# Patient Record
Sex: MALE | Race: WHITE | URBAN - METROPOLITAN AREA
[De-identification: names, ages, dates, MRNs, and addresses within clinical notes are randomized per-mention and may not be internally consistent; named-entity substitution may affect disease eponyms.]

---

## 2023-05-08 ENCOUNTER — OFFICE VISIT (OUTPATIENT)
Dept: URBAN - METROPOLITAN AREA CLINIC 76 | Facility: CLINIC | Age: 58
End: 2023-05-08

## 2023-05-08 DIAGNOSIS — H25.813 COMBINED FORMS OF AGE-RELATED CATARACT, BILATERAL: Primary | ICD-10-CM

## 2023-05-08 PROCEDURE — 99204 OFFICE O/P NEW MOD 45 MIN: CPT | Performed by: OPTOMETRIST

## 2023-05-08 ASSESSMENT — KERATOMETRY
OD: 42.75
OS: 42.50

## 2023-05-08 ASSESSMENT — INTRAOCULAR PRESSURE
OD: 15
OS: 16

## 2023-05-08 ASSESSMENT — VISUAL ACUITY
OD: 20/200
OS: 20/20

## 2023-05-08 NOTE — IMPRESSION/PLAN
Impression: Combined forms of age-related cataract, bilateral: H25.813. Plan: Cataracts account for the patient's complaints. Discussed all risks, benefits, procedures and recovery of cataracts surgery. Patient understands that changing the glasses prescription will not significantly improve vision. Patient desires to have surgery, refer to Dr. Andrew Schumacher for evaluation of phacoemulsification with IOL. -Recommend ORA and target distance OD
-s/p LASIK OU

## 2023-05-16 ENCOUNTER — PRE-OPERATIVE VISIT (OUTPATIENT)
Dept: URBAN - METROPOLITAN AREA CLINIC 76 | Facility: CLINIC | Age: 58
End: 2023-05-16

## 2023-05-16 DIAGNOSIS — H25.813 COMBINED FORMS OF AGE-RELATED CATARACT, BILATERAL: Primary | ICD-10-CM

## 2023-05-16 PROCEDURE — 92134 CPTRZ OPH DX IMG PST SGM RTA: CPT | Performed by: STUDENT IN AN ORGANIZED HEALTH CARE EDUCATION/TRAINING PROGRAM

## 2023-05-16 PROCEDURE — 92025 CPTRIZED CORNEAL TOPOGRAPHY: CPT | Performed by: STUDENT IN AN ORGANIZED HEALTH CARE EDUCATION/TRAINING PROGRAM

## 2023-05-16 ASSESSMENT — PACHYMETRY
OD: 24.50
OD: 3.81
OS: 24.40
OS: 3.78

## 2023-05-23 ENCOUNTER — OFFICE VISIT (OUTPATIENT)
Dept: URBAN - METROPOLITAN AREA CLINIC 76 | Facility: CLINIC | Age: 58
End: 2023-05-23

## 2023-05-23 DIAGNOSIS — H25.813 COMBINED FORMS OF AGE-RELATED CATARACT, BILATERAL: Primary | ICD-10-CM

## 2023-05-23 DIAGNOSIS — H17.89 OTHER CORNEAL SCAR: ICD-10-CM

## 2023-05-23 PROCEDURE — 99205 OFFICE O/P NEW HI 60 MIN: CPT | Performed by: STUDENT IN AN ORGANIZED HEALTH CARE EDUCATION/TRAINING PROGRAM

## 2023-05-23 RX ORDER — PREDNISOLONE ACETATE 10 MG/ML
1 % SUSPENSION/ DROPS OPHTHALMIC
Qty: 10 | Refills: 1 | Status: ACTIVE
Start: 2023-05-23

## 2023-05-23 RX ORDER — KETOROLAC TROMETHAMINE 5 MG/ML
0.5 % SOLUTION OPHTHALMIC
Qty: 5 | Refills: 1 | Status: INACTIVE
Start: 2023-05-23 | End: 2023-05-23

## 2023-05-23 RX ORDER — OFLOXACIN 3 MG/ML
0.3 % SOLUTION/ DROPS OPHTHALMIC
Qty: 5 | Refills: 1 | Status: ACTIVE
Start: 2023-05-23

## 2023-05-23 ASSESSMENT — KERATOMETRY
OD: 42.88
OS: 42.38

## 2023-05-23 ASSESSMENT — VISUAL ACUITY
OD: 20/200
OS: 20/20

## 2023-05-23 ASSESSMENT — INTRAOCULAR PRESSURE
OS: 22
OD: 15

## 2023-05-23 NOTE — IMPRESSION/PLAN
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 s/p LASIK OU. Plan: Discussed condition and added risks due to previous LASIK.

## 2023-05-23 NOTE — IMPRESSION/PLAN
Impression: Combined forms of age-related cataract, bilateral: H25.813. Visually significant OD. Plan: Discussed cataracts, treatment options, and surgical risks/benefits with patient. Patient understands there are tradeoffs to each intraocular lens choice and glasses may still be necessary after surgery. The patient is bothered by the symptoms of their cataract which is not correctable with a change in glasses and their ADL's are impaired. Patient elects surgical treatment and wishes to proceed. There is reasonable expectation that both the patient's visual and functional status will improve after surgery. Post op care to be patient's choice of provider/clinic and may include patient's choice of comanagement with a local or distant optometrist/ophthalmologist. Recommend ORA. Recommend Dextenza or Topical Drops postoperative. Advised the need for additional supplementation with Ketorolac BID x 2 weeks if using Dextenza. Lens Recommendation: [employer]: OK for [employer] Aim OD: -0.25. Aim: -0.25. First Eye: OD then OS Notes: s/p LASIK OU

## 2023-06-19 ENCOUNTER — SURGERY (OUTPATIENT)
Dept: URBAN - METROPOLITAN AREA SURGERY 47 | Facility: SURGERY | Age: 58
End: 2023-06-19

## 2023-06-19 DIAGNOSIS — H25.813 COMBINED FORMS OF AGE-RELATED CATARACT, BILATERAL: Primary | ICD-10-CM

## 2023-06-19 PROCEDURE — 66984 XCAPSL CTRC RMVL W/O ECP: CPT | Performed by: STUDENT IN AN ORGANIZED HEALTH CARE EDUCATION/TRAINING PROGRAM

## 2023-06-19 PROCEDURE — PR1CP PR1CP: CUSTOM | Performed by: STUDENT IN AN ORGANIZED HEALTH CARE EDUCATION/TRAINING PROGRAM

## 2023-06-20 ENCOUNTER — POST-OPERATIVE VISIT (OUTPATIENT)
Dept: URBAN - METROPOLITAN AREA CLINIC 76 | Facility: CLINIC | Age: 58
End: 2023-06-20

## 2023-06-20 DIAGNOSIS — Z48.810 ENCOUNTER FOR SURGICAL AFTERCARE FOLLOWING SURGERY ON A SENSE ORGAN: Primary | ICD-10-CM

## 2023-06-20 ASSESSMENT — INTRAOCULAR PRESSURE
OS: 16
OD: 18

## 2023-06-20 NOTE — IMPRESSION/PLAN
Impression: S/P Cataract Extraction by phacoemulsification with IOL placement; ORA OD - 1 Day. Encounter for surgical aftercare following surgery on a sense organ  Z48.810. Plan: Reassured pt of PO course. Use artificial tears for comfort. Start Pred and Ocuflox QID OD. Pt to call with concerns.

## 2023-06-26 ENCOUNTER — POST-OPERATIVE VISIT (OUTPATIENT)
Dept: URBAN - METROPOLITAN AREA CLINIC 76 | Facility: CLINIC | Age: 58
End: 2023-06-26

## 2023-06-26 DIAGNOSIS — Z48.810 ENCOUNTER FOR SURGICAL AFTERCARE FOLLOWING SURGERY ON A SENSE ORGAN: Primary | ICD-10-CM

## 2023-06-26 DIAGNOSIS — H52.4 PRESBYOPIA: ICD-10-CM

## 2023-06-26 PROCEDURE — 99024 POSTOP FOLLOW-UP VISIT: CPT

## 2023-06-26 PROCEDURE — 92015 DETERMINE REFRACTIVE STATE: CPT

## 2023-06-26 ASSESSMENT — INTRAOCULAR PRESSURE
OD: 13
OS: 15

## 2023-06-26 ASSESSMENT — VISUAL ACUITY
OD: 20/25
OS: 20/30

## 2023-06-26 ASSESSMENT — KERATOMETRY
OS: 42.50
OD: 42.50

## 2023-06-26 NOTE — IMPRESSION/PLAN
Impression: S/P Cataract Extraction by phacoemulsification with IOL placement; ORA OD - 7 Days. Encounter for surgical aftercare following surgery on a sense organ  Z48.810. Plan: Right eye is healing well. Instructed patient to continue using the drops as previously directed and reviewed post op instructions and all questions were answered. Instructed patient to RTC immediately if he experiences pain, tenderness or redness in operated eye or if any other issues or complications occur. Patient expressed understanding. Patient OK to proceed with 2nd eye.

## 2023-07-17 ENCOUNTER — SURGERY (OUTPATIENT)
Dept: URBAN - METROPOLITAN AREA SURGERY 47 | Facility: SURGERY | Age: 58
End: 2023-07-17

## 2023-07-17 DIAGNOSIS — H25.812 COMBINED FORMS OF AGE-RELATED CATARACT, LEFT EYE: Primary | ICD-10-CM

## 2023-07-17 PROCEDURE — PR1CP PR1CP: CUSTOM | Performed by: STUDENT IN AN ORGANIZED HEALTH CARE EDUCATION/TRAINING PROGRAM

## 2023-07-17 PROCEDURE — 66984 XCAPSL CTRC RMVL W/O ECP: CPT | Performed by: STUDENT IN AN ORGANIZED HEALTH CARE EDUCATION/TRAINING PROGRAM

## 2023-07-18 ENCOUNTER — POST-OPERATIVE VISIT (OUTPATIENT)
Dept: URBAN - METROPOLITAN AREA CLINIC 76 | Facility: CLINIC | Age: 58
End: 2023-07-18

## 2023-07-18 DIAGNOSIS — Z96.1 PRESENCE OF INTRAOCULAR LENS: Primary | ICD-10-CM

## 2023-07-18 ASSESSMENT — INTRAOCULAR PRESSURE
OD: 16
OS: 16

## 2023-07-18 NOTE — IMPRESSION/PLAN
Impression: S/P Cataract Extraction by phacoemulsification with IOL placement; ORA OS - 1 Day. Presence of intraocular lens  Z96.1. Plan: Recommend using At's for comfort. Start Pred QID OS and Ocuflox QID OS as directed. Pt to call with any concerns.

## 2023-07-24 ENCOUNTER — POST-OPERATIVE VISIT (OUTPATIENT)
Dept: URBAN - METROPOLITAN AREA CLINIC 76 | Facility: CLINIC | Age: 58
End: 2023-07-24

## 2023-07-24 DIAGNOSIS — Z96.1 PRESENCE OF INTRAOCULAR LENS: Primary | ICD-10-CM

## 2023-07-24 ASSESSMENT — INTRAOCULAR PRESSURE
OS: 15
OD: 15

## 2023-07-24 ASSESSMENT — VISUAL ACUITY
OD: 20/20
OS: 20/20

## 2023-08-21 ENCOUNTER — POST-OPERATIVE VISIT (OUTPATIENT)
Dept: URBAN - METROPOLITAN AREA CLINIC 76 | Facility: CLINIC | Age: 58
End: 2023-08-21

## 2023-08-21 DIAGNOSIS — Z96.1 PRESENCE OF INTRAOCULAR LENS: ICD-10-CM

## 2023-08-21 DIAGNOSIS — H52.4 PRESBYOPIA: Primary | ICD-10-CM

## 2023-08-21 ASSESSMENT — VISUAL ACUITY
OS: 20/25
OD: 20/30

## 2023-08-21 ASSESSMENT — INTRAOCULAR PRESSURE
OD: 14
OS: 16